# Patient Record
Sex: MALE | Race: WHITE | NOT HISPANIC OR LATINO | Employment: FULL TIME | URBAN - NONMETROPOLITAN AREA
[De-identification: names, ages, dates, MRNs, and addresses within clinical notes are randomized per-mention and may not be internally consistent; named-entity substitution may affect disease eponyms.]

---

## 2023-09-05 ENCOUNTER — HOSPITAL ENCOUNTER (OUTPATIENT)
Facility: HOSPITAL | Age: 28
Setting detail: OBSERVATION
Discharge: ANOTHER HOSPITAL | End: 2023-09-05
Attending: EMERGENCY MEDICINE | Admitting: FAMILY MEDICINE
Payer: COMMERCIAL

## 2023-09-05 ENCOUNTER — APPOINTMENT (EMERGENCY)
Dept: RADIOLOGY | Facility: HOSPITAL | Age: 28
End: 2023-09-05
Payer: COMMERCIAL

## 2023-09-05 VITALS
HEIGHT: 63 IN | WEIGHT: 315 LBS | TEMPERATURE: 98 F | BODY MASS INDEX: 55.81 KG/M2 | RESPIRATION RATE: 16 BRPM | SYSTOLIC BLOOD PRESSURE: 151 MMHG | HEART RATE: 62 BPM | OXYGEN SATURATION: 95 % | DIASTOLIC BLOOD PRESSURE: 89 MMHG

## 2023-09-05 DIAGNOSIS — R07.9 CHEST PAIN: Primary | ICD-10-CM

## 2023-09-05 PROBLEM — K21.9 GERD (GASTROESOPHAGEAL REFLUX DISEASE): Status: ACTIVE | Noted: 2023-09-05

## 2023-09-05 PROBLEM — Z72.0 TOBACCO USE: Status: ACTIVE | Noted: 2023-09-05

## 2023-09-05 PROBLEM — I10 HTN (HYPERTENSION): Status: ACTIVE | Noted: 2023-09-05

## 2023-09-05 LAB
2HR DELTA HS TROPONIN: 60 NG/L
ALBUMIN SERPL BCP-MCNC: 4.4 G/DL (ref 3.5–5)
ALP SERPL-CCNC: 50 U/L (ref 34–104)
ALT SERPL W P-5'-P-CCNC: 18 U/L (ref 7–52)
ANION GAP SERPL CALCULATED.3IONS-SCNC: 7 MMOL/L
APTT PPP: 31 SECONDS (ref 23–37)
AST SERPL W P-5'-P-CCNC: 16 U/L (ref 13–39)
ATRIAL RATE: 69 BPM
ATRIAL RATE: 86 BPM
BASOPHILS # BLD AUTO: 0.03 THOUSANDS/ÂΜL (ref 0–0.1)
BASOPHILS NFR BLD AUTO: 0 % (ref 0–1)
BILIRUB SERPL-MCNC: 0.47 MG/DL (ref 0.2–1)
BUN SERPL-MCNC: 11 MG/DL (ref 5–25)
CALCIUM SERPL-MCNC: 9.2 MG/DL (ref 8.4–10.2)
CARDIAC TROPONIN I PNL SERPL HS: 1147 NG/L
CARDIAC TROPONIN I PNL SERPL HS: 16 NG/L
CARDIAC TROPONIN I PNL SERPL HS: 76 NG/L
CHLORIDE SERPL-SCNC: 102 MMOL/L (ref 96–108)
CO2 SERPL-SCNC: 26 MMOL/L (ref 21–32)
CREAT SERPL-MCNC: 0.82 MG/DL (ref 0.6–1.3)
EOSINOPHIL # BLD AUTO: 0.09 THOUSAND/ÂΜL (ref 0–0.61)
EOSINOPHIL NFR BLD AUTO: 1 % (ref 0–6)
ERYTHROCYTE [DISTWIDTH] IN BLOOD BY AUTOMATED COUNT: 13.2 % (ref 11.6–15.1)
GFR SERPL CREATININE-BSD FRML MDRD: 118 ML/MIN/1.73SQ M
GLUCOSE SERPL-MCNC: 104 MG/DL (ref 65–140)
HCT VFR BLD AUTO: 46.9 % (ref 36.5–49.3)
HGB BLD-MCNC: 15.3 G/DL (ref 12–17)
IMM GRANULOCYTES # BLD AUTO: 0.01 THOUSAND/UL (ref 0–0.2)
IMM GRANULOCYTES NFR BLD AUTO: 0 % (ref 0–2)
INR PPP: 0.87 (ref 0.84–1.19)
LYMPHOCYTES # BLD AUTO: 2.2 THOUSANDS/ÂΜL (ref 0.6–4.47)
LYMPHOCYTES NFR BLD AUTO: 31 % (ref 14–44)
MCH RBC QN AUTO: 29 PG (ref 26.8–34.3)
MCHC RBC AUTO-ENTMCNC: 32.6 G/DL (ref 31.4–37.4)
MCV RBC AUTO: 89 FL (ref 82–98)
MONOCYTES # BLD AUTO: 0.48 THOUSAND/ÂΜL (ref 0.17–1.22)
MONOCYTES NFR BLD AUTO: 7 % (ref 4–12)
NEUTROPHILS # BLD AUTO: 4.32 THOUSANDS/ÂΜL (ref 1.85–7.62)
NEUTS SEG NFR BLD AUTO: 61 % (ref 43–75)
NRBC BLD AUTO-RTO: 0 /100 WBCS
P AXIS: 47 DEGREES
P AXIS: 49 DEGREES
PLATELET # BLD AUTO: 160 THOUSANDS/UL (ref 149–390)
PMV BLD AUTO: 9.8 FL (ref 8.9–12.7)
POTASSIUM SERPL-SCNC: 3.9 MMOL/L (ref 3.5–5.3)
PR INTERVAL: 132 MS
PR INTERVAL: 132 MS
PROT SERPL-MCNC: 7.2 G/DL (ref 6.4–8.4)
PROTHROMBIN TIME: 12.1 SECONDS (ref 11.6–14.5)
QRS AXIS: 15 DEGREES
QRS AXIS: 16 DEGREES
QRSD INTERVAL: 90 MS
QRSD INTERVAL: 90 MS
QT INTERVAL: 386 MS
QT INTERVAL: 412 MS
QTC INTERVAL: 441 MS
QTC INTERVAL: 461 MS
RBC # BLD AUTO: 5.27 MILLION/UL (ref 3.88–5.62)
SARS-COV-2 RNA RESP QL NAA+PROBE: NEGATIVE
SODIUM SERPL-SCNC: 135 MMOL/L (ref 135–147)
T WAVE AXIS: -2 DEGREES
T WAVE AXIS: 4 DEGREES
TSH SERPL DL<=0.05 MIU/L-ACNC: 3.24 UIU/ML (ref 0.45–4.5)
VENTRICULAR RATE: 69 BPM
VENTRICULAR RATE: 86 BPM
WBC # BLD AUTO: 7.13 THOUSAND/UL (ref 4.31–10.16)

## 2023-09-05 PROCEDURE — 85025 COMPLETE CBC W/AUTO DIFF WBC: CPT | Performed by: EMERGENCY MEDICINE

## 2023-09-05 PROCEDURE — 99285 EMERGENCY DEPT VISIT HI MDM: CPT | Performed by: EMERGENCY MEDICINE

## 2023-09-05 PROCEDURE — 84484 ASSAY OF TROPONIN QUANT: CPT | Performed by: EMERGENCY MEDICINE

## 2023-09-05 PROCEDURE — NC001 PR NO CHARGE: Performed by: FAMILY MEDICINE

## 2023-09-05 PROCEDURE — 87635 SARS-COV-2 COVID-19 AMP PRB: CPT

## 2023-09-05 PROCEDURE — 99223 1ST HOSP IP/OBS HIGH 75: CPT | Performed by: FAMILY MEDICINE

## 2023-09-05 PROCEDURE — 99285 EMERGENCY DEPT VISIT HI MDM: CPT

## 2023-09-05 PROCEDURE — 80053 COMPREHEN METABOLIC PANEL: CPT | Performed by: EMERGENCY MEDICINE

## 2023-09-05 PROCEDURE — 84443 ASSAY THYROID STIM HORMONE: CPT

## 2023-09-05 PROCEDURE — 93005 ELECTROCARDIOGRAM TRACING: CPT

## 2023-09-05 PROCEDURE — 85730 THROMBOPLASTIN TIME PARTIAL: CPT | Performed by: EMERGENCY MEDICINE

## 2023-09-05 PROCEDURE — 84484 ASSAY OF TROPONIN QUANT: CPT | Performed by: PHYSICIAN ASSISTANT

## 2023-09-05 PROCEDURE — 85610 PROTHROMBIN TIME: CPT | Performed by: EMERGENCY MEDICINE

## 2023-09-05 PROCEDURE — 71045 X-RAY EXAM CHEST 1 VIEW: CPT

## 2023-09-05 PROCEDURE — 36415 COLL VENOUS BLD VENIPUNCTURE: CPT | Performed by: EMERGENCY MEDICINE

## 2023-09-05 RX ORDER — ACETAMINOPHEN 325 MG/1
650 TABLET ORAL ONCE
Status: COMPLETED | OUTPATIENT
Start: 2023-09-05 | End: 2023-09-05

## 2023-09-05 RX ORDER — HEPARIN SODIUM 1000 [USP'U]/ML
4000 INJECTION, SOLUTION INTRAVENOUS; SUBCUTANEOUS ONCE
Status: COMPLETED | OUTPATIENT
Start: 2023-09-05 | End: 2023-09-05

## 2023-09-05 RX ORDER — PANTOPRAZOLE SODIUM 40 MG/1
40 TABLET, DELAYED RELEASE ORAL
Status: DISCONTINUED | OUTPATIENT
Start: 2023-09-05 | End: 2023-09-05 | Stop reason: HOSPADM

## 2023-09-05 RX ORDER — DOCUSATE SODIUM 100 MG/1
100 CAPSULE, LIQUID FILLED ORAL 2 TIMES DAILY PRN
Status: DISCONTINUED | OUTPATIENT
Start: 2023-09-05 | End: 2023-09-05 | Stop reason: HOSPADM

## 2023-09-05 RX ORDER — HEPARIN SODIUM 1000 [USP'U]/ML
4000 INJECTION, SOLUTION INTRAVENOUS; SUBCUTANEOUS EVERY 6 HOURS PRN
Status: DISCONTINUED | OUTPATIENT
Start: 2023-09-05 | End: 2023-09-05 | Stop reason: HOSPADM

## 2023-09-05 RX ORDER — NITROGLYCERIN 0.4 MG/1
0.4 TABLET SUBLINGUAL ONCE
Status: DISCONTINUED | OUTPATIENT
Start: 2023-09-05 | End: 2023-09-05

## 2023-09-05 RX ORDER — ACETAMINOPHEN 325 MG/1
650 TABLET ORAL EVERY 6 HOURS PRN
Status: DISCONTINUED | OUTPATIENT
Start: 2023-09-05 | End: 2023-09-05 | Stop reason: HOSPADM

## 2023-09-05 RX ORDER — CLOPIDOGREL BISULFATE 75 MG/1
75 TABLET ORAL DAILY
Status: DISCONTINUED | OUTPATIENT
Start: 2023-09-05 | End: 2023-09-05 | Stop reason: HOSPADM

## 2023-09-05 RX ORDER — ATORVASTATIN CALCIUM 40 MG/1
40 TABLET, FILM COATED ORAL
Status: DISCONTINUED | OUTPATIENT
Start: 2023-09-05 | End: 2023-09-05 | Stop reason: HOSPADM

## 2023-09-05 RX ORDER — CALCIUM CARBONATE 500 MG/1
1000 TABLET, CHEWABLE ORAL DAILY PRN
Status: DISCONTINUED | OUTPATIENT
Start: 2023-09-05 | End: 2023-09-05 | Stop reason: HOSPADM

## 2023-09-05 RX ORDER — OMEPRAZOLE 20 MG/1
20 CAPSULE, DELAYED RELEASE ORAL DAILY
COMMUNITY

## 2023-09-05 RX ORDER — ROSUVASTATIN CALCIUM 20 MG/1
20 TABLET, COATED ORAL DAILY
COMMUNITY

## 2023-09-05 RX ORDER — ASPIRIN 81 MG/1
324 TABLET, CHEWABLE ORAL ONCE
Status: COMPLETED | OUTPATIENT
Start: 2023-09-05 | End: 2023-09-05

## 2023-09-05 RX ORDER — HEPARIN SODIUM 10000 [USP'U]/100ML
3-20 INJECTION, SOLUTION INTRAVENOUS
Refills: 0
Start: 2023-09-05

## 2023-09-05 RX ORDER — HEPARIN SODIUM 10000 [USP'U]/100ML
3-20 INJECTION, SOLUTION INTRAVENOUS
Status: DISCONTINUED | OUTPATIENT
Start: 2023-09-05 | End: 2023-09-05 | Stop reason: HOSPADM

## 2023-09-05 RX ORDER — CLOPIDOGREL BISULFATE 75 MG/1
75 TABLET ORAL DAILY
COMMUNITY

## 2023-09-05 RX ORDER — NITROGLYCERIN 0.4 MG/1
0.4 TABLET SUBLINGUAL
Status: DISCONTINUED | OUTPATIENT
Start: 2023-09-05 | End: 2023-09-05 | Stop reason: HOSPADM

## 2023-09-05 RX ORDER — HEPARIN SODIUM 1000 [USP'U]/ML
2000 INJECTION, SOLUTION INTRAVENOUS; SUBCUTANEOUS EVERY 6 HOURS PRN
Status: DISCONTINUED | OUTPATIENT
Start: 2023-09-05 | End: 2023-09-05 | Stop reason: HOSPADM

## 2023-09-05 RX ORDER — METOPROLOL TARTRATE 50 MG/1
50 TABLET, FILM COATED ORAL EVERY 12 HOURS SCHEDULED
COMMUNITY

## 2023-09-05 RX ORDER — HYDRALAZINE HYDROCHLORIDE 20 MG/ML
5 INJECTION INTRAMUSCULAR; INTRAVENOUS EVERY 6 HOURS PRN
Status: DISCONTINUED | OUTPATIENT
Start: 2023-09-05 | End: 2023-09-05 | Stop reason: HOSPADM

## 2023-09-05 RX ADMIN — HEPARIN SODIUM 11.1 UNITS/KG/HR: 10000 INJECTION, SOLUTION INTRAVENOUS at 05:23

## 2023-09-05 RX ADMIN — NITROGLYCERIN 0.4 MG: 0.4 TABLET SUBLINGUAL at 02:55

## 2023-09-05 RX ADMIN — NITROGLYCERIN 0.4 MG: 0.4 TABLET SUBLINGUAL at 02:50

## 2023-09-05 RX ADMIN — PANTOPRAZOLE SODIUM 40 MG: 40 TABLET, DELAYED RELEASE ORAL at 09:00

## 2023-09-05 RX ADMIN — ACETAMINOPHEN 650 MG: 325 TABLET, FILM COATED ORAL at 03:30

## 2023-09-05 RX ADMIN — ASPIRIN 81 MG 324 MG: 81 TABLET ORAL at 02:49

## 2023-09-05 RX ADMIN — HEPARIN SODIUM 4000 UNITS: 1000 INJECTION INTRAVENOUS; SUBCUTANEOUS at 05:23

## 2023-09-05 RX ADMIN — METOPROLOL TARTRATE 25 MG: 25 TABLET, FILM COATED ORAL at 09:00

## 2023-09-05 RX ADMIN — CLOPIDOGREL BISULFATE 75 MG: 75 TABLET ORAL at 09:00

## 2023-09-05 NOTE — NURSING NOTE
Discharged and transferred via ambulance to [facility]. Heparin drip at 11.1 units/kg/hr and infusing, this will be taken along with patient.  Report called to [facility].

## 2023-09-05 NOTE — EMTALA/ACUTE CARE TRANSFER
[address] [facility] UNIT  [address]  [address] [place] [address]  Dept: [phone]      ACUTE CARE TRANSFER CONSENT    NAME Jono Marcos                                         1995                              MRN [id number]    I have been informed of my rights regarding examination, treatment, and transfer   by Dr. Cristina Peterson MD    Benefits: Specialized equipment and/or services available at the receiving facility (Include comment)________________________, Continuity of care    Risks: Potential for delay in receiving treatment, Potential deterioration of medical condition, Loss of IV, Increased discomfort during transfer, Possible worsening of condition or death during transfer      Consent for Transfer:  I acknowledge that my medical condition has been evaluated and explained to me by the treating physician or other qualified medical person and/or my attending physician, who has recommended that I be transferred to the service of  Accepting Physician: Dr. Cami Engel at [address] [place] [address] [address] Name, [address] : [address] The above potential benefits of such transfer, the potential risks associated with such transfer, and the probable risks of not being transferred have been explained to me, and I fully understand them. The doctor has explained that, in my case, the benefits of transfer outweigh the risks. I agree to be transferred. I authorize the performance of emergency medical procedures and treatments upon me in both transit and upon arrival at the receiving facility. Additionally, I authorize the release of any and all medical records to the receiving facility and request they be transported with me, if possible. I understand that the safest mode of transportation during a medical emergency is an ambulance and that the Hospital advocates the use of this mode of transport.  Risks of traveling to the receiving facility by car, including absence of medical control, life sustaining equipment, such as oxygen, and medical personnel has been explained to me and I fully understand them. (KIRSTEN CORRECT BOX BELOW)  [  ]  I consent to the stated transfer and to be transported by ambulance/helicopter. [  ]  I consent to the stated transfer, but refuse transportation by ambulance and accept full responsibility for my transportation by car. I understand the risks of non-ambulance transfers and I exonerate the Hospital and its staff from any deterioration in my condition that results from this refusal.    X___________________________________________    DATE  23  TIME________  Signature of patient or legally responsible individual signing on patient behalf           RELATIONSHIP TO PATIENT_________________________          Provider Certification    NAME Travis Wright                                         1995                              MRN [id number]    A medical screening exam was performed on the above named patient.   Based on the examination:    Condition Necessitating Transfer Elevated troponin's and chest pain needing cardiac cath    Patient Condition: The patient has been stabilized such that within reasonable medical probability, no material deterioration of the patient condition or the condition of the unborn child(william) is likely to result from the transfer    Reason for Transfer: Level of Care needed not available at this facility    Transfer Requirements: Facility [address]   · Space available and qualified personnel available for treatment as acknowledged by Leena Dalton  · Agreed to accept transfer and to provide appropriate medical treatment as acknowledged by       Dr. Lamont Esqueda  · Appropriate medical records of the examination and treatment of the patient are provided at the time of transfer   [address] _______  · Transfer will be performed by qualified personnel from [facility]  and appropriate transfer equipment as required, including the use of necessary and appropriate life support measures. Provider Certification: I have examined the patient and explained the following risks and benefits of being transferred/refusing transfer to the patient/family:  General risk, such as traffic hazards, adverse weather conditions, rough terrain or turbulence, possible failure of equipment (including vehicle or aircraft), or consequences of actions of persons outside the control of the transport personnel, Unanticipated needs of medical equipment and personnel during transport, Risk of worsening condition, The possibility of a transport vehicle being unavailable      Based on these reasonable risks and benefits to the patient and/or the unborn child(william), and based upon the information available at the time of the patient’s examination, I certify that the medical benefits reasonably to be expected from the provision of appropriate medical treatments at another medical facility outweigh the increasing risks, if any, to the individual’s medical condition, and in the case of labor to the unborn child, from effecting the transfer.     Armando Ron PA-C__________________________________________ DATE 09/05/23        TIME_______      ORIGINAL - SEND TO MEDICAL RECORDS   COPY - SEND WITH PATIENT DURING TRANSFER

## 2023-09-05 NOTE — ASSESSMENT & PLAN NOTE
Cut down his use to 1 cigarette a day from 1 pack a day  Offer nicotine patch  Encourage complete cessation

## 2023-09-05 NOTE — ASSESSMENT & PLAN NOTE
Continue patient's Lopressor -change dose to 25 twice daily given bradycardia overnight on telemetry  Was hypertensive at time of presentation 169/109  Blood pressure slightly improved to 151/89 this morning    Adjust medication as necessary  Hydralazine as needed

## 2023-09-05 NOTE — ASSESSMENT & PLAN NOTE
· Presents with chest pain -woke him from sleep around 1:30 AM, started with chest tightness radiating to left arm, then felt like heaviness/pressure on his mid chest -relieved with nitro in the ER has not recurred  · Hx NSTEMI in 2017 , had cardiac cath that showed LAD D1 lesion x 2 80% and 90% occluded. Not amendable to PCI, medical management was recommended at that time. Had negative stress test in 2019 .    · Troponin 16->76 - 4hr is pending   · EKG nonischemic    · PHILLIP 4  · Ed provider discussed with cardiology, recommending can stay at [address] and start Heparin ACS   · Appreciate cardiology consult   · Continue Heparin ACS  · Continue to trend troponins to peak  · Continue ASA, plavix , statin, BB - patient previously on all three however has not been taking   · Monitor on telemetry     Evaluated this AM by cardiology - recommending transfer to other facility for cardiac cath - accepted t [facility]

## 2023-09-05 NOTE — DISCHARGE SUMMARY
[address] [facility]  Discharge- Oscar Scales 1995, 25 y.o. male MRN: [id number]  Unit/Bed#: -01 Encounter: [id number]  Primary Care Provider: No primary care provider on file. Date and time admitted to hospital: 9/5/2023  2:14 AM    * Chest pain  Assessment & Plan  · Presents with chest pain -woke him from sleep around 1:30 AM, started with chest tightness radiating to left arm, then felt like heaviness/pressure on his mid chest -relieved with nitro in the ER has not recurred  · Hx NSTEMI in 2017 , had cardiac cath that showed LAD D1 lesion x 2 80% and 90% occluded. Not amendable to PCI, medical management was recommended at that time. Had negative stress test in 2019 . · Troponin 16->76 - 4hr is pending   · EKG nonischemic    · PHILLIP 4  · Ed provider discussed with cardiology, recommending can stay at [address] and start Heparin ACS   · Appreciate cardiology consult   · Continue Heparin ACS  · Continue to trend troponins to peak  · Continue ASA, plavix , statin, BB - patient previously on all three however has not been taking   · Monitor on telemetry     Evaluated this AM by cardiology - recommending transfer to other facility for cardiac cath - accepted t [facility]     GERD (gastroesophageal reflux disease)  Assessment & Plan  Continue PPI    Tobacco use  Assessment & Plan  Cut down his use to 1 cigarette a day from 1 pack a day  Offer nicotine patch  Encourage complete cessation    HTN (hypertension)  Assessment & Plan  Continue patient's Lopressor -change dose to 25 twice daily given bradycardia overnight on telemetry  Was hypertensive at time of presentation 169/109  Blood pressure slightly improved to 151/89 this morning    Adjust medication as necessary  Hydralazine as needed    Medical Problems     Resolved Problems  Date Reviewed: 9/5/2023   None       Discharging Physician / Practitioner: Kassy Parsons PA-C  PCP: No primary care provider on file.   Admission Date: Admission Orders (From admission, onward)     Ordered        09/05/23 0505  Place in Observation  Once                      Discharge Date: 09/05/23    Consultations During Hospital Stay:  · Cardiology     Procedures Performed:   · None    Significant Findings / Test Results:   XR chest 1 view portable - Result Date: 9/5/2023  · No acute cardiopulmonary disease    Troponin - 16 > 76 > 1147    Incidental Findings:   · None   · I reviewed the above mentioned incidental findings with the patient and/or family and they expressed understanding. Test Results Pending at Discharge (will require follow up): · None     Outpatient Tests Requested:  · None    Complications:  None    Reason for Admission: Chest pain     Hospital Course:   Matthew Guaman is a 25 y.o. male patient with history of previous MI in 2017, hypertension and hyperlipidemia, tobacco use who originally presented to the hospital on 9/5/2023 due to chest pain starting earlier this morning. The chest pain was sudden onset and woke him up from sleep. Presented to the ER after taking 2 nitros at home with minimal relief. Was loaded with aspirin in the ER, also given nitroglycerin. His troponins went from 16-76. He was discussed between ER provider and on-call cardiology that patient will be staying at [facility] with a heparin drip. Cardiology formally evaluated in the morning and felt patient should be transferred for cardiac cath. His then third repeat troponin elevated to 1147. He has been chest pain-free since ER, no ischemic changes on his EKG. he is agreeable to transfer and has been accepted at [facility] for cardiac cath. Please see above list of diagnoses and related plan for additional information. Condition at Discharge: stable    Discussion with Family: Patient declined call to .      Discharge instructions/Information to patient and family:   See after visit summary for information provided to patient and family. Provisions for Follow-Up Care:  See after visit summary for information related to follow-up care and any pertinent home health orders. Disposition:   [address] Transfer to [facility]    Planned Readmission: None     Discharge Statement:  I spent 35 minutes discharging the patient. This time was spent on the day of discharge. I had direct contact with the patient on the day of discharge. Greater than 50% of the total time was spent examining patient, answering all patient questions, arranging and discussing plan of care with patient as well as directly providing post-discharge instructions. Additional time then spent on discharge activities. Discharge Medications:  See after visit summary for reconciled discharge medications provided to patient and/or family.       **Please Note: This note may have been constructed using a voice recognition system**

## 2023-09-05 NOTE — TRANSPORTATION MEDICAL NECESSITY
Section I - General Information    Name of Patient: Rashida Noyola                 : 1995    Medicare #:   Transport Date: 23 (PCS is valid for round trips on this date and for all repetitive trips in the 60-day range as noted below.)  Origin:  [address]: [facility]  Is the pt's stay covered under Medicare Part A (PPS/DRG)   []     Closest appropriate facility? If no, why is transport to more distant facility required? Yes  If hospice pt, is this transport related to pt's terminal illness? NA       Section II - Medical Necessity Questionnaire  Ambulance transportation is medically necessary only if other means of transport are contraindicated or would be potentially harmful to the patient. To meet this requirement, the patient must either be "bed confined" or suffer from a condition such that transport by means other than ambulance is contraindicated by the patient's condition. The following questions must be answered by the medical professional signing below for this form to be valid:    1)  Describe the MEDICAL CONDITION (physical and/or mental) of this patient  [address] that requires the patient to be transported in an ambulance and why transport by other means is contraindicated by the patient's condition:pt transferring to a higher level of care, needs cardiac cath that can not be done at [address] [facility]    2) Is the patient "bed confined" as defined below? Yes  To be "be confined" the patient must satisfy all three of the following conditions: (1) unable to get up from bed without Assistance; AND (2) unable to ambulate; AND (3) unable to sit in a chair or wheelchair. 3) Can this patient safely be transported by car or wheelchair van (i.e., seated during transport without a medical attendant or monitoring)?    No    4) In addition to completing questions 1-3 above, please check any of the following conditions that apply*:   *Note: supporting documentation for any boxes checked must be maintained in the patient's medical records. If hosp-hosp transfer, describe services needed at 2nd facility not available at 1st facility? IV meds/fluids required   Cardiac monitoring required en route   Other(specify) pt being transfered to higher level of care, needs casrdiac cant that can not be performed at [address] of Physician or Healthcare Professional  I certify that the above information is true and correct based on my evaluation of this patient, and represent that the patient requires transport by ambulance and that other forms of transport are contraindicated. I understand that this information will be used by the Centers for Medicare and Medicaid Services (CMS) to support the determination of medical necessity for ambulance services, and I represent that I have personal knowledge of the patient's condition at time of transport. []  If this box is checked, I also certify that the patient is physically or mentally incapable of signing the ambulance service's claim and that the institution with which I am affiliated has furnished care, services, or assistance to the patient. My signature below is made on behalf of the patient pursuant to 42 CFR §424.36(b)(4). In accordance with 42 CFR §424.37, the specific reason(s) that the patient is physically or mentally incapable of signing the claim form is as follows: stephanie paulino.       Signature of Physician* or Healthcare Professional______________________________________________________________  Signature Date 09/05/23 (For scheduled repetitive transports, this form is not valid for transports performed more than 60 days after this date)    Printed Name & Credentials of Physician or Healthcare Professional (MD, DO, RN, etc.)________________________________  *Form must be signed by patient's attending physician for scheduled, repetitive transports.  For non-repetitive, unscheduled ambulance transports, if unable to obtain the signature of the attending physician, any of the following may sign (choose appropriate option below)  [] Physician Assistant []  Clinical Nurse Specialist [x]  Registered Nurse  []  Nurse Practitioner  [] Discharge Planner

## 2023-09-05 NOTE — DISCHARGE INSTRUCTIONS
Thank you for letting us take care of you. You have been evaluated for chest pain. Please follow-up as instructed. Please return for worsening symptoms. At this time, you have no clinical evidence of symptoms or problems that will require hospitalization, however you should be evaluated soon by a primary care physician, and contact information has been provided. Follow up with your primary care physician. This is important as many medical conditions can be managed as an outpatient, in addition to routine health screening. Seeing your primary doctor often can help identify changes in the medical issue that brought you to the ED for care today. If you experience any new symptoms or acute worsening of current symptoms, please return to the ED.

## 2023-09-05 NOTE — ED PROVIDER NOTES
History  Chief Complaint   Patient presents with   • Chest Pain     Pt was woke from his sleep with chest discomfort that radiates into his left arm. Took 2 nitro at home, states it relieved the pain for 5 mins. 78-year-old male with past medical history pertinent for previous MI, smoking who presents to the emergency department for chest pain that woke him up from sleep and radiates into his left arm. States he took 2 nitro at home and relieved the pain for just 5 minutes. States the nitroglycerin did take away his pain but then it came back. States that it went down on his own and is now 6 out of 10 when it was earlier 10 out of 10. Reports that he is supposed to use a blood thinner, aspirin, dyslipidemia medications but has not been able to get refills as he has not seen his doctor recently. Denies any other concerns at this time. None       Past Medical History:   Diagnosis Date   • Heart attack [facility])        History reviewed. No pertinent surgical history. History reviewed. No pertinent family history. I have reviewed and agree with the history as documented. E-Cigarette/Vaping     E-Cigarette/Vaping Substances     Social History     Tobacco Use   • Smoking status: Some Days     Types: Cigarettes   • Smokeless tobacco: Never   Substance Use Topics   • Alcohol use: Yes   • Drug use: Not Currently       Review of Systems   Constitutional: Negative for activity change, appetite change, chills, diaphoresis and fever. HENT: Negative for congestion, rhinorrhea and sore throat. Eyes: Negative for visual disturbance. Respiratory: Negative for chest tightness and shortness of breath. Cardiovascular: Positive for chest pain. Negative for palpitations and leg swelling. Gastrointestinal: Negative for abdominal pain, constipation, diarrhea, nausea and vomiting. Genitourinary: Negative for difficulty urinating and hematuria. Musculoskeletal: Negative for back pain and neck pain. Skin: Negative for color change and rash. Neurological: Negative for dizziness, weakness and headaches. Psychiatric/Behavioral: Negative for behavioral problems. Physical Exam  Physical Exam  Vitals and nursing note reviewed. Constitutional:       General: He is not in acute distress. Appearance: He is well-developed. He is obese. He is not diaphoretic. HENT:      Head: Normocephalic and atraumatic. Right Ear: External ear normal.      Left Ear: External ear normal.      Nose: Nose normal.   Eyes:      Pupils: Pupils are equal, round, and reactive to light. Cardiovascular:      Rate and Rhythm: Normal rate and regular rhythm. Heart sounds: Normal heart sounds. Pulmonary:      Effort: Pulmonary effort is normal. No respiratory distress. Breath sounds: Normal breath sounds. No wheezing or rales. Abdominal:      General: Bowel sounds are normal.      Palpations: Abdomen is soft. There is no mass. Tenderness: There is no abdominal tenderness. Musculoskeletal:         General: No tenderness or deformity. Normal range of motion. Cervical back: Normal range of motion and neck supple. Skin:     General: Skin is warm and dry. Capillary Refill: Capillary refill takes less than 2 seconds. Findings: No erythema or rash. Neurological:      Mental Status: He is alert. Motor: No abnormal muscle tone.    Psychiatric:         Behavior: Behavior normal.         Vital Signs  ED Triage Vitals   Temperature Pulse Respirations Blood Pressure SpO2   09/05/23 0219 09/05/23 0219 09/05/23 0219 09/05/23 0219 09/05/23 0219   98.2 °F (36.8 °C) 83 16 (!) 169/109 98 %      Temp Source Heart Rate Source Patient Position - Orthostatic VS BP Location FiO2 (%)   09/05/23 0219 09/05/23 0219 09/05/23 0219 09/05/23 0219 --   Temporal Monitor Lying Right arm       Pain Score       09/05/23 0330       No Pain           Vitals:    09/05/23 0330 09/05/23 0400 09/05/23 0430 09/05/23 0500 BP: 147/92 132/91 127/88 147/89   Pulse: 72 59 65 70   Patient Position - Orthostatic VS: Lying Lying Lying Lying         Visual Acuity      ED Medications  Medications   nitroglycerin (NITROSTAT) SL tablet 0.4 mg (0.4 mg Sublingual Given 9/5/23 0255)   heparin (porcine) injection 4,000 Units (has no administration in time range)   heparin (porcine) 25,000 units in 0.45% NaCl 250 mL infusion (premix) (has no administration in time range)   heparin (porcine) injection 4,000 Units (has no administration in time range)   heparin (porcine) injection 2,000 Units (has no administration in time range)   aspirin chewable tablet 324 mg (324 mg Oral Given 9/5/23 0249)   acetaminophen (TYLENOL) tablet 650 mg (650 mg Oral Given 9/5/23 0330)       Diagnostic Studies  Results Reviewed     Procedure Component Value Units Date/Time    CBC [[id number]]     Lab Status: No result Specimen: Blood     HS Troponin I 2hr [[id number]]  (Abnormal) Collected: 09/05/23 0417    Lab Status: Final result Specimen: Blood from Arm, Right Updated: 09/05/23 0450     hs TnI 2hr 76 ng/L      Delta 2hr hsTnI 60 ng/L     HS Troponin 0hr (reflex protocol) [[id number]]  (Normal) Collected: 09/05/23 0223    Lab Status: Final result Specimen: Blood from Arm, Right Updated: 09/05/23 0256     hs TnI 0hr 16 ng/L     Comprehensive metabolic panel [[id number]] Collected: 09/05/23 0223    Lab Status: Final result Specimen: Blood from Arm, Right Updated: 09/05/23 0249     Sodium 135 mmol/L      Potassium 3.9 mmol/L      Chloride 102 mmol/L      CO2 26 mmol/L      ANION GAP 7 mmol/L      BUN 11 mg/dL      Creatinine 0.82 mg/dL      Glucose 104 mg/dL      Calcium 9.2 mg/dL      AST 16 U/L      ALT 18 U/L      Alkaline Phosphatase 50 U/L      Total Protein 7.2 g/dL      Albumin 4.4 g/dL      Total Bilirubin 0.47 mg/dL      eGFR 118 ml/min/1.73sq m     Narrative:      [facility] guidelines for Chronic Kidney Disease (CKD):   •  Stage 1 with normal or high GFR (GFR > 90 mL/min/1.73 square meters)  •  Stage 2 Mild CKD (GFR = 60-89 mL/min/1.73 square meters)  •  Stage 3A Moderate CKD (GFR = 45-59 mL/min/1.73 square meters)  •  Stage 3B Moderate CKD (GFR = 30-44 mL/min/1.73 square meters)  •  Stage 4 Severe CKD (GFR = 15-29 mL/min/1.73 square meters)  •  Stage 5 End Stage CKD (GFR <15 mL/min/1.73 square meters)  Note: GFR calculation is accurate only with a steady state creatinine    CBC and differential [[id number]] Collected: 09/05/23 0223    Lab Status: Final result Specimen: Blood from Arm, Right Updated: 09/05/23 0230     WBC 7.13 Thousand/uL      RBC 5.27 Million/uL      Hemoglobin 15.3 g/dL      Hematocrit 46.9 %      MCV 89 fL      MCH 29.0 pg      MCHC 32.6 g/dL      RDW 13.2 %      MPV 9.8 fL      Platelets 662 Thousands/uL      nRBC 0 /100 WBCs      Neutrophils Relative 61 %      Immat GRANS % 0 %      Lymphocytes Relative 31 %      Monocytes Relative 7 %      Eosinophils Relative 1 %      Basophils Relative 0 %      Neutrophils Absolute 4.32 Thousands/µL      Immature Grans Absolute 0.01 Thousand/uL      Lymphocytes Absolute 2.20 Thousands/µL      Monocytes Absolute 0.48 Thousand/µL      Eosinophils Absolute 0.09 Thousand/µL      Basophils Absolute 0.03 Thousands/µL     HS Troponin I 4hr [[id number]] Collected: 09/05/23 0223    Lab Status: No result Specimen: Blood from Arm, Right     APTT six (6) hours after Heparin bolus/drip initiation or dosing change [[id number]] Collected: 09/05/23 0223    Lab Status: No result Specimen: Blood from Arm, Right     Protime-INR [[id number]] Collected: 09/05/23 1715    Lab Status: No result Specimen: Blood from Arm, Right                  XR chest 1 view portable    (Results Pending)              Procedures  ECG 12 Lead Documentation Only    Date/Time: 9/5/2023 2:26 AM    Performed by: Steve Fischer MD  Authorized by: Steve Fischer MD    ECG reviewed by me, the ED Provider: yes    Patient location: ED  Previous ECG:     Previous ECG:  Unavailable  Interpretation:     Interpretation: normal    Rate:     ECG rate:  86    ECG rate assessment: normal    Rhythm:     Rhythm: sinus rhythm    Ectopy:     Ectopy: none    QRS:     QRS axis:  Normal  Conduction:     Conduction: normal    ST segments:     ST segments:  Normal  T waves:     T waves: normal               ED Course            RESULTS:  Results Reviewed     Procedure Component Value Units Date/Time    CBC [[id number]]     Lab Status: No result Specimen: Blood     HS Troponin I 2hr [[id number]]  (Abnormal) Collected: 09/05/23 0417    Lab Status: Final result Specimen: Blood from Arm, Right Updated: 09/05/23 0450     hs TnI 2hr 76 ng/L      Delta 2hr hsTnI 60 ng/L     HS Troponin 0hr (reflex protocol) [[id number]]  (Normal) Collected: 09/05/23 0223    Lab Status: Final result Specimen: Blood from Arm, Right Updated: 09/05/23 0256     hs TnI 0hr 16 ng/L     Comprehensive metabolic panel [[id number]] Collected: 09/05/23 0223    Lab Status: Final result Specimen: Blood from Arm, Right Updated: 09/05/23 0249     Sodium 135 mmol/L      Potassium 3.9 mmol/L      Chloride 102 mmol/L      CO2 26 mmol/L      ANION GAP 7 mmol/L      BUN 11 mg/dL      Creatinine 0.82 mg/dL      Glucose 104 mg/dL      Calcium 9.2 mg/dL      AST 16 U/L      ALT 18 U/L      Alkaline Phosphatase 50 U/L      Total Protein 7.2 g/dL      Albumin 4.4 g/dL      Total Bilirubin 0.47 mg/dL      eGFR 118 ml/min/1.73sq m     Narrative:      [place] guidelines for Chronic Kidney Disease (CKD):   •  Stage 1 with normal or high GFR (GFR > 90 mL/min/1.73 square meters)  •  Stage 2 Mild CKD (GFR = 60-89 mL/min/1.73 square meters)  •  Stage 3A Moderate CKD (GFR = 45-59 mL/min/1.73 square meters)  •  Stage 3B Moderate CKD (GFR = 30-44 mL/min/1.73 square meters)  •  Stage 4 Severe CKD (GFR = 15-29 mL/min/1.73 square meters)  •  Stage 5 End Stage CKD (GFR <15 mL/min/1.73 square meters)  Note: GFR calculation is accurate only with a steady state creatinine    CBC and differential [[id number]] Collected: 09/05/23 0223    Lab Status: Final result Specimen: Blood from Arm, Right Updated: 09/05/23 0230     WBC 7.13 Thousand/uL      RBC 5.27 Million/uL      Hemoglobin 15.3 g/dL      Hematocrit 46.9 %      MCV 89 fL      MCH 29.0 pg      MCHC 32.6 g/dL      RDW 13.2 %      MPV 9.8 fL      Platelets 625 Thousands/uL      nRBC 0 /100 WBCs      Neutrophils Relative 61 %      Immat GRANS % 0 %      Lymphocytes Relative 31 %      Monocytes Relative 7 %      Eosinophils Relative 1 %      Basophils Relative 0 %      Neutrophils Absolute 4.32 Thousands/µL      Immature Grans Absolute 0.01 Thousand/uL      Lymphocytes Absolute 2.20 Thousands/µL      Monocytes Absolute 0.48 Thousand/µL      Eosinophils Absolute 0.09 Thousand/µL      Basophils Absolute 0.03 Thousands/µL     HS Troponin I 4hr [[id number]] Collected: 09/05/23 0223    Lab Status: No result Specimen: Blood from Arm, Right     APTT six (6) hours after Heparin bolus/drip initiation or dosing change [[id number]] Collected: 09/05/23 0223    Lab Status: No result Specimen: Blood from Arm, Right     Protime-INR [[id number]] Collected: 09/05/23 9191    Lab Status: No result Specimen: Blood from Arm, Right           XR chest 1 view portable    (Results Pending)       Vitals:    09/05/23 0330 09/05/23 0400 09/05/23 0430 09/05/23 0500   BP: 147/92 132/91 127/88 147/89   TempSrc:       Pulse: 72 59 65 70   Resp: 13 14 15 17   Patient Position - Orthostatic VS: Lying Lying Lying Lying   Temp:             Medical Decision Making  78-year-old male with past medical history pertinent for previous MI, smoking who presents to the emergency department for chest pain that woke him up from sleep and radiates into his left arm. States he took 2 nitro at home and relieved the pain for just 5 minutes.   Vital signs here notable for hypertension with blood pressures in the 160s over 100s. Otherwise vital signs are nonconcerning. Patient had EKG obtained on arrival which showed no acute signs of ischemia. Patient had exam as above otherwise. Patient given aspirin and nitroglycerin for supportive care. Lab work obtained to evaluate further along with chest x-ray. Troponin returned within normal limits. Repeat troponin returned elevated from 16 to 70. Therefore patient was discussed with cardiologist on-call, Dr. Baudilio Araiza, who reviewed case and EKG and agree that patient will need heparin and be admitted to medicine here. Discussed case with hospitalist here, Rex Euceda PA-C, who accepted patient for admission under Dr. Cricket Ayala.  Patient informed and was agreeable to plan. Amount and/or Complexity of Data Reviewed  Labs: ordered. Decision-making details documented in ED Course. Radiology: ordered and independent interpretation performed. Decision-making details documented in ED Course. ECG/medicine tests: ordered and independent interpretation performed. Decision-making details documented in ED Course. Risk  OTC drugs. Prescription drug management. Disposition  Final diagnoses:   Chest pain     Time reflects when diagnosis was documented in both MDM as applicable and the Disposition within this note     Time User Action Codes Description Comment    9/5/2023  2:27 AM Corry Kwok Add [R07.9] Chest pain       ED Disposition     ED Disposition   Admit    Condition   Stable    Date/Time   Tue Sep 5, 2023  5:04 AM    Comment   Case was discussed with Rex Euceda PA-C and the patient's admission status was agreed to be Admission Status: observation status to the service of Dr. Cricket Ayala .           Lily Barrera     Follow up With Specialties Details Why Contact Info    Your cardiologist and primary care doctor              Patient's Medications    No medications on file       No discharge procedures on file.     PDMP Review     None          ED Provider  Electronically Signed by           Carlita Mclain MD  09/05/23 9155

## 2023-09-05 NOTE — CONSULTS
Consultation - Cardiology   Dena Betancourt 25 y.o. male MRN: [id number]  Unit/Bed#: -01 Encounter: [id number]    Assessment/Plan     Assessment:  NSTEMI- elevated troponin, normal EKG, chest pain relieved by nitro, hx of CAD   CAD sp MI in 2017    LAD D1 lesion x 2 80% and 90% occluded. Not amendable to PCI, medical management was recommended at that time. Had negative stress test in 2019 . HLD- likely FH  Obesity  Smoking    Plan:  1. Given patient's history, elevated troponin, cp relieved with nitro consistent with angina recommend transfer to [facility]. Patient previously followed with [facility] so he prefers to be transferred there. 2. Continue DAPT, heparin infusion, BB, statin  3. Monitor on telemetry  4. No further change sin regimen. We can fu as an OP afterwards. History of Present Illness   Physician Requesting Consult: Vandana Spencer MD  Reason for Consult / Principal Problem: chest pain  HPI: Dena Betancourt is a 25y.o. year old male with history of CAD sp MI in 2017 in small branch of LAD, HLD (likely familial hyperlipidemia), obesity, smoking presented to the hospital last night for chest pain. Reports at 1 AM he woke up with chest pain radiating to his left arm. He states that it was 8/10. He took two nitro which didn't help. Within one hour he was here in the ED, got a nitro and completely relieved his pain. He reports this is very unusual for him. He reports he has not used a nitro in 5 years and thinks his nitro at home was bad. He reports that he has been "busy with working, havent been to the doctor" and hasnt taken his meds in 6 months. He reports he was on ASA/plavix, metoprolol and crestor. He works driving a forklift. He reports he does have moments where he has to  heavy objects but does not regularly experience chest pain. On admission found to have elevated troponin but only checked x 2. ekg is non ischemic. He has not had chest pain since his nitro last night in the ED. He smokes 1 cig a day currently but prior to a year ago he was smoking 1/2 pack for 5-6 years. He reports no other drug use. He reports his Dad had CAD with an MI in his late 35s. Inpatient consult to Cardiology  Consult performed by: Lo Meraz PA-C  Consult ordered by: Jarrett Aparicio PA-C          Review of Systems   Constitutional: Negative for chills, fatigue and unexpected weight change. Respiratory: Positive for chest tightness. Negative for shortness of breath. Cardiovascular: Positive for chest pain. Negative for palpitations and leg swelling. Gastrointestinal: Negative for nausea. Musculoskeletal: Negative for arthralgias. Skin: Negative for color change, pallor and rash. Neurological: Negative for dizziness and weakness. Psychiatric/Behavioral: Negative for agitation, behavioral problems and confusion. Historical Information   Past Medical History:   Diagnosis Date   • Heart attack [facility])    • Hypertension      History reviewed. No pertinent surgical history. Social History     Substance and Sexual Activity   Alcohol Use Yes     Social History     Substance and Sexual Activity   Drug Use Not Currently     E-Cigarette/Vaping     E-Cigarette/Vaping Substances     Social History     Tobacco Use   Smoking Status Some Days   • Types: Cigarettes   Smokeless Tobacco Never     Family History: as above    Meds/Allergies   all current active meds have been reviewed  No Known Allergies    Objective   Vitals: Blood pressure 151/89, pulse 62, temperature 98 °F (36.7 °C), temperature source Temporal, resp. rate 16, height 5' 3" (1.6 m), weight (!) 149 kg (328 lb 14.8 oz), SpO2 97 %.   Orthostatic Blood Pressures    Flowsheet Row Most Recent Value   Blood Pressure 151/89 filed at 09/05/2023 0601   Patient Position - Orthostatic VS Lying filed at 09/05/2023 0601            Intake/Output Summary (Last 24 hours) at 9/5/2023 0842  Last data filed at 9/5/2023 0601  Gross per 24 hour Intake 6.42 ml   Output --   Net 6.42 ml       Invasive Devices     Peripheral Intravenous Line  Duration           Peripheral IV 09/05/23 Dorsal (posterior); Left Hand <1 day    Peripheral IV 09/05/23 Right Antecubital <1 day                Physical Exam  Constitutional:       Appearance: Normal appearance. HENT:      Head: Normocephalic and atraumatic. Eyes:      Pupils: Pupils are equal, round, and reactive to light. Cardiovascular:      Rate and Rhythm: Normal rate. Heart sounds: No murmur heard. Pulmonary:      Effort: Pulmonary effort is normal.      Breath sounds: No wheezing. Abdominal:      Palpations: Abdomen is soft. Musculoskeletal:         General: No swelling. Cervical back: Neck supple. Skin:     General: Skin is warm and dry. Capillary Refill: Capillary refill takes less than 2 seconds. Neurological:      General: No focal deficit present. Mental Status: He is alert and oriented to person, place, and time. Psychiatric:         Mood and Affect: Mood normal.         Thought Content: Thought content normal.         Lab Results:   I have personally reviewed pertinent lab results.     CBC with diff:   Results from last 7 days   Lab Units 09/05/23 0223   WBC Thousand/uL 7.13   RBC Million/uL 5.27   HEMOGLOBIN g/dL 15.3   HEMATOCRIT % 46.9   MCV fL 89   MCH pg 29.0   MCHC g/dL 32.6   RDW % 13.2   MPV fL 9.8   PLATELETS Thousands/uL 160     CMP:   Results from last 7 days   Lab Units 09/05/23 0223   SODIUM mmol/L 135   CHLORIDE mmol/L 102   CO2 mmol/L 26   BUN mg/dL 11   CREATININE mg/dL 0.82   CALCIUM mg/dL 9.2   AST U/L 16   ALT U/L 18   ALK PHOS U/L 50   EGFR ml/min/1.73sq m 118     HS Troponin:   0   Lab Value Date/Time    HSTNI0 16 09/05/2023 0223    HSTNI2 76 (H) 09/05/2023 0417     BNP:   Results from last 7 days   Lab Units 09/05/23 0223   POTASSIUM mmol/L 3.9   CHLORIDE mmol/L 102   CO2 mmol/L 26   BUN mg/dL 11   CREATININE mg/dL 0.82   CALCIUM mg/dL 9.2 EGFR ml/min/1.73sq m 118     Coags:   Results from last 7 days   Lab Units 09/05/23  0223   PTT seconds 31   INR  0.87     TSH:     Magnesium:     Lipid Profile:     Imaging: I have personally reviewed pertinent reports.     EKG: Normal sinus rhythm  Normal ECG  No previous ECGs available  Confirmed by Ignacio Saini ([id number]) on 9/5/2023 7:37:36 AM

## 2023-09-05 NOTE — ASSESSMENT & PLAN NOTE
· Presents with chest pain -woke him from sleep around 1:30 AM, started with chest tightness radiating to left arm, then felt like heaviness/pressure on his mid chest -relieved with nitro in the ER has not recurred  · Hx NSTEMI in 2017 , had cardiac cath that showed LAD D1 lesion x 2 80% and 90% occluded. Not amendable to PCI, medical management was recommended at that time. Had negative stress test in 2019 .    · Troponin 16->76 - 4hr is pending   · EKG nonischemic    · PHILLIP 4  · Ed provider discussed with cardiology, recommending can stay at [address] and start Heparin ACS   · Appreciate cardiology consult   · Continue Heparin ACS  · Continue to trend troponins to peak  · Continue ASA, plavix , statin - patient previously on all three however has not been taking   · Monitor on telemetry

## 2023-09-05 NOTE — H&P
[facility]  H&P  Name: Eric Gaspar 25 y.o. male I MRN: [id number]  Unit/Bed#: -01 I Date of Admission: 9/5/2023   Date of Service: 9/5/2023 I Hospital Day: 0      Assessment/Plan   * Chest pain  Assessment & Plan  · Presents with chest pain -woke him from sleep around 1:30 AM, started with chest tightness radiating to left arm, then felt like heaviness/pressure on his mid chest -relieved with nitro in the ER has not recurred  · Hx NSTEMI in 2017 , had cardiac cath that showed LAD D1 lesion x 2 80% and 90% occluded. Not amendable to PCI, medical management was recommended at that time. Had negative stress test in 2019 . · Troponin 16->76 - 4hr is pending   · EKG nonischemic    · PHILLIP 4  · Ed provider discussed with cardiology, recommending can stay at [address] and start Heparin ACS   · Appreciate cardiology consult   · Continue Heparin ACS  · Continue to trend troponins to peak  · Continue ASA, plavix , statin - patient previously on all three however has not been taking   · Monitor on telemetry     GERD (gastroesophageal reflux disease)  Assessment & Plan  Continue PPI    Tobacco use  Assessment & Plan  Cut down his use to 1 cigarette a day from 1 pack a day  Offer nicotine patch  Encourage complete cessation    HTN (hypertension)  Assessment & Plan  Continue patient's Lopressor -change dose to 25 twice daily given bradycardia overnight on telemetry  Was hypertensive at time of presentation 169/109  Blood pressure slightly improved to 151/89 this morning    Adjust medication as necessary  Hydralazine as needed       VTE Pharmacologic Prophylaxis:   Moderate Risk (Score 3-4) - Pharmacological DVT Prophylaxis Ordered: heparin drip. Code Status: Level 1 - Full Code   Discussion with family: Patient declined call to .      Anticipated Length of Stay: Patient will be admitted on an observation basis with an anticipated length of stay of less than 2 midnights secondary to cp, elevated trops - heparin acs, tele , card cx. Total Time Spent on Date of Encounter in care of patient: 75 minutes This time was spent on one or more of the following: performing physical exam; counseling and coordination of care; obtaining or reviewing history; documenting in the medical record; reviewing/ordering tests, medications or procedures; communicating with other healthcare professionals and discussing with patient's family/caregivers. Chief Complaint: chest pain    History of Present Illness:  Eric Gaspar is a 25 y.o. male with a PMH of previous MI in 2017, hypertension, GERD, tobacco use, obesity who presents with sudden onset chest pain starting this morning around 1:30 AM.  This woke patient from sleep. Describes it as initial chest tightness that radiated to left arm. Once radiation to left arm subsided he felt more so heaviness in his chest.  He took nitro glycerin x2 at home and relieved the pain for 5 minutes. He was previously supposed to be on Plavix and statin, takes aspirin as well however has not been taking for quite some time given he ran out of refills has not been able to get another appointment. Denies any dizziness, lightheadedness, diaphoresis, shortness of breath, cough, abdominal pain nausea or vomiting during this event. Otherwise is feeling quite well. On arrival to the ER was given additional nitro and loaded with aspirin, his pain subsided. Has not recurred. In the ER he did have elevation in his troponin from 16-70, was discussed with on-call cardiology overnight and was started on heparin drip cardiology consult this a.m.. He is maintained on a telemetry monitor and will continue his previous meds. Review of Systems:  Review of Systems   Constitutional: Negative for activity change, chills and fever. HENT: Negative for congestion, rhinorrhea and sore throat. Eyes: Negative for visual disturbance. Respiratory: Positive for chest tightness. Negative for cough and shortness of breath. Cardiovascular: Positive for chest pain. Negative for palpitations. Gastrointestinal: Negative for abdominal pain, constipation, diarrhea, nausea and vomiting. Genitourinary: Negative for difficulty urinating, dysuria, frequency and urgency. Musculoskeletal: Negative for arthralgias and myalgias. Skin: Negative for rash. Neurological: Negative for seizures, syncope, weakness and headaches. All other systems reviewed and are negative. Past Medical and Surgical History:   Past Medical History:   Diagnosis Date   • Heart attack [facility])    • Hypertension        History reviewed. No pertinent surgical history. Meds/Allergies:  Prior to Admission medications    Not on File     I have reviewed home medications with patient personally. Allergies: No Known Allergies    Social History:  Marital Status: Single     Patient Pre-hospital Living Situation: Home  Patient Pre-hospital Level of Mobility: walks  Patient Pre-hospital Diet Restrictions: none  Substance Use History:   Social History     Substance and Sexual Activity   Alcohol Use Yes     Social History     Tobacco Use   Smoking Status Some Days   • Types: Cigarettes   Smokeless Tobacco Never     Social History     Substance and Sexual Activity   Drug Use Not Currently       Family History:  History reviewed. No pertinent family history. Physical Exam:     Vitals:   Blood Pressure: 151/89 (09/05/23 0601)  Pulse: 62 (09/05/23 0601)  Temperature: 98 °F (36.7 °C) (09/05/23 0601)  Temp Source: Temporal (09/05/23 0601)  Respirations: 16 (09/05/23 0601)  Height: 5' 3" (160 cm) (09/05/23 0219)  Weight - Scale: (!) 149 kg (328 lb 14.8 oz) (09/05/23 0219)  SpO2: 97 % (09/05/23 0601)    Physical Exam  Vitals and nursing note reviewed. Constitutional:       General: He is not in acute distress. Appearance: Normal appearance. He is obese. HENT:      Head: Normocephalic and atraumatic.       Nose: No congestion. Mouth/Throat:      Mouth: Mucous membranes are moist.   Eyes:      Conjunctiva/sclera: Conjunctivae normal.   Cardiovascular:      Rate and Rhythm: Normal rate and regular rhythm. Pulses: Normal pulses. Heart sounds: Normal heart sounds. No murmur heard. Pulmonary:      Effort: Pulmonary effort is normal. No respiratory distress. Breath sounds: Normal breath sounds. Abdominal:      General: Bowel sounds are normal.      Palpations: Abdomen is soft. Tenderness: There is no abdominal tenderness. Musculoskeletal:         General: Normal range of motion. Right lower leg: No edema. Left lower leg: No edema. Skin:     General: Skin is warm and dry. Neurological:      Mental Status: He is alert and oriented to person, place, and time. Additional Data:     Lab Results:  Results from last 7 days   Lab Units 09/05/23  0223   WBC Thousand/uL 7.13   HEMOGLOBIN g/dL 15.3   HEMATOCRIT % 46.9   PLATELETS Thousands/uL 160   NEUTROS PCT % 61   LYMPHS PCT % 31   MONOS PCT % 7   EOS PCT % 1     Results from last 7 days   Lab Units 09/05/23  0223   SODIUM mmol/L 135   POTASSIUM mmol/L 3.9   CHLORIDE mmol/L 102   CO2 mmol/L 26   BUN mg/dL 11   CREATININE mg/dL 0.82   ANION GAP mmol/L 7   CALCIUM mg/dL 9.2   ALBUMIN g/dL 4.4   TOTAL BILIRUBIN mg/dL 0.47   ALK PHOS U/L 50   ALT U/L 18   AST U/L 16   GLUCOSE RANDOM mg/dL 104     Results from last 7 days   Lab Units 09/05/23  0223   INR  0.87                   Lines/Drains:  Invasive Devices     Peripheral Intravenous Line  Duration           Peripheral IV 09/05/23 Dorsal (posterior); Left Hand <1 day    Peripheral IV 09/05/23 Right Antecubital <1 day                    Imaging: Personally reviewed the following imaging: chest xray  XR chest 1 view portable   Final Result by Marlena Espinosa MD (09/05 0730)      No acute cardiopulmonary disease.                   Workstation performed: [id number]             EKG and Other Studies Reviewed on Admission:   · EKG: NSR. HR 86.    ** Please Note: This note has been constructed using a voice recognition system.  **

## 2023-09-05 NOTE — PLAN OF CARE
Problem: PAIN - ADULT  Goal: Verbalizes/displays adequate comfort level or baseline comfort level  Description: Interventions:  - Encourage patient to monitor pain and request assistance  - Assess pain using appropriate pain scale  - Administer analgesics based on type and severity of pain and evaluate response  - Implement non-pharmacological measures as appropriate and evaluate response  - Consider cultural and social influences on pain and pain management  - Notify physician/advanced practitioner if interventions unsuccessful or patient reports new pain  Outcome: Progressing     Problem: INFECTION - ADULT  Goal: Absence or prevention of progression during hospitalization  Description: INTERVENTIONS:  - Assess and monitor for signs and symptoms of infection  - Monitor lab/diagnostic results  - Monitor all insertion sites, i.e. indwelling lines, tubes, and drains  - Monitor endotracheal if appropriate and nasal secretions for changes in amount and color  - Rousseau appropriate cooling/warming therapies per order  - Administer medications as ordered  - Instruct and encourage patient and family to use good hand hygiene technique  - Identify and instruct in appropriate isolation precautions for identified infection/condition  Outcome: Progressing     Problem: SAFETY ADULT  Goal: Patient will remain free of falls  Description: INTERVENTIONS:  - Educate patient/family on patient safety including physical limitations  - Instruct patient to call for assistance with activity   - Consult OT/PT to assist with strengthening/mobility   - Keep Call bell within reach  - Keep bed low and locked with side rails adjusted as appropriate  - Keep care items and personal belongings within reach  - Initiate and maintain comfort rounds  - Make Fall Risk Sign visible to staff  - Apply yellow socks and bracelet for high fall risk patients  - Consider moving patient to room near nurses station  Outcome: Progressing  Goal: Maintain or return to baseline ADL function  Description: INTERVENTIONS:  -  Assess patient's ability to carry out ADLs; assess patient's baseline for ADL function and identify physical deficits which impact ability to perform ADLs (bathing, care of mouth/teeth, toileting, grooming, dressing, etc.)  - Assess/evaluate cause of self-care deficits   - Assess range of motion  - Assess patient's mobility; develop plan if impaired  - Assess patient's need for assistive devices and provide as appropriate  - Encourage maximum independence but intervene and supervise when necessary  - Involve family in performance of ADLs  - Assess for home care needs following discharge   - Consider OT consult to assist with ADL evaluation and planning for discharge  - Provide patient education as appropriate  Outcome: Progressing  Goal: Maintains/Returns to pre admission functional level  Description: INTERVENTIONS:  - Perform BMAT or MOVE assessment daily.   - Set and communicate daily mobility goal to care team and patient/family/caregiver.    - Collaborate with rehabilitation services on mobility goals if consulted  - Out of bed for toileting  - Record patient progress and toleration of activity level   Outcome: Progressing     Problem: DISCHARGE PLANNING  Goal: Discharge to home or other facility with appropriate resources  Description: INTERVENTIONS:  - Identify barriers to discharge w/patient and caregiver  - Arrange for needed discharge resources and transportation as appropriate  - Identify discharge learning needs (meds, wound care, etc.)  - Arrange for interpretive services to assist at discharge as needed  - Refer to Case Management Department for coordinating discharge planning if the patient needs post-hospital services based on physician/advanced practitioner order or complex needs related to functional status, cognitive ability, or social support system  Outcome: Progressing     Problem: Knowledge Deficit  Goal: Patient/family/caregiver demonstrates understanding of disease process, treatment plan, medications, and discharge instructions  Description: Complete learning assessment and assess knowledge base.   Interventions:  - Provide teaching at level of understanding  - Provide teaching via preferred learning methods  Outcome: Progressing     Problem: CARDIOVASCULAR - ADULT  Goal: Maintains optimal cardiac output and hemodynamic stability  Description: INTERVENTIONS:  - Monitor I/O, vital signs and rhythm  - Monitor for S/S and trends of decreased cardiac output  - Administer and titrate ordered vasoactive medications to optimize hemodynamic stability  - Assess quality of pulses, skin color and temperature  - Assess for signs of decreased coronary artery perfusion  - Instruct patient to report change in severity of symptoms  Outcome: Progressing  Goal: Absence of cardiac dysrhythmias or at baseline rhythm  Description: INTERVENTIONS:  - Continuous cardiac monitoring, vital signs, obtain 12 lead EKG if ordered  - Administer antiarrhythmic and heart rate control medications as ordered  - Monitor electrolytes and administer replacement therapy as ordered  Outcome: Progressing